# Patient Record
Sex: MALE | Race: OTHER | Employment: FULL TIME | URBAN - METROPOLITAN AREA
[De-identification: names, ages, dates, MRNs, and addresses within clinical notes are randomized per-mention and may not be internally consistent; named-entity substitution may affect disease eponyms.]

---

## 2017-09-16 ENCOUNTER — HOSPITAL ENCOUNTER (EMERGENCY)
Age: 42
Discharge: HOME | End: 2017-09-16
Attending: EMERGENCY MEDICINE
Payer: COMMERCIAL

## 2017-09-16 VITALS
OXYGEN SATURATION: 98 % | WEIGHT: 210 LBS | DIASTOLIC BLOOD PRESSURE: 90 MMHG | HEART RATE: 73 BPM | RESPIRATION RATE: 16 BRPM | SYSTOLIC BLOOD PRESSURE: 154 MMHG | HEIGHT: 70 IN | TEMPERATURE: 98.2 F | BODY MASS INDEX: 30.06 KG/M2

## 2017-09-16 DIAGNOSIS — T75.4XXA TASER INJURY, INITIAL ENCOUNTER: Primary | ICD-10-CM
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
## 2017-09-16 PROCEDURE — 10120 INC&RMVL FB SUBQ TISS SMPL: CPT

## 2017-09-16 PROCEDURE — 99283 EMERGENCY DEPT VISIT LOW MDM: CPT

## 2017-09-16 PROCEDURE — 90471 IMMUNIZATION ADMIN: CPT | Performed by: EMERGENCY MEDICINE

## 2017-09-16 PROCEDURE — 6360000002 HC RX W HCPCS: Performed by: EMERGENCY MEDICINE

## 2017-09-16 PROCEDURE — 90715 TDAP VACCINE 7 YRS/> IM: CPT | Performed by: EMERGENCY MEDICINE

## 2017-09-16 RX ADMIN — TETANUS TOXOID, REDUCED DIPHTHERIA TOXOID AND ACELLULAR PERTUSSIS VACCINE, ADSORBED 0.5 ML: 5; 2.5; 8; 8; 2.5 SUSPENSION INTRAMUSCULAR at 18:10

## 2017-09-16 ASSESSMENT — PAIN DESCRIPTION - ORIENTATION: ORIENTATION: RIGHT

## 2017-09-16 ASSESSMENT — PAIN DESCRIPTION - FREQUENCY: FREQUENCY: CONTINUOUS

## 2017-09-16 ASSESSMENT — PAIN DESCRIPTION - PROGRESSION: CLINICAL_PROGRESSION: NOT CHANGED

## 2017-09-16 ASSESSMENT — PAIN DESCRIPTION - DESCRIPTORS: DESCRIPTORS: CONSTANT

## 2017-09-16 ASSESSMENT — PAIN DESCRIPTION - PAIN TYPE: TYPE: ACUTE PAIN

## 2017-09-16 ASSESSMENT — ENCOUNTER SYMPTOMS
SHORTNESS OF BREATH: 0
ABDOMINAL PAIN: 0

## 2017-09-16 ASSESSMENT — PAIN SCALES - GENERAL: PAINLEVEL_OUTOF10: 2

## 2017-09-16 ASSESSMENT — PAIN DESCRIPTION - LOCATION: LOCATION: HAND

## 2017-09-20 ENCOUNTER — OFFICE VISIT (OUTPATIENT)
Dept: INTERNAL MEDICINE CLINIC | Age: 42
End: 2017-09-20
Payer: COMMERCIAL

## 2017-09-20 VITALS
HEIGHT: 70 IN | HEART RATE: 75 BPM | WEIGHT: 214 LBS | BODY MASS INDEX: 30.64 KG/M2 | SYSTOLIC BLOOD PRESSURE: 123 MMHG | DIASTOLIC BLOOD PRESSURE: 82 MMHG | TEMPERATURE: 98.1 F | OXYGEN SATURATION: 97 %

## 2017-09-20 DIAGNOSIS — R53.83 FATIGUE, UNSPECIFIED TYPE: Primary | ICD-10-CM

## 2017-09-20 DIAGNOSIS — L98.9 SKIN LESION: ICD-10-CM

## 2017-09-20 DIAGNOSIS — T14.8XXA SUPERFICIAL ABRASION: ICD-10-CM

## 2017-09-20 PROCEDURE — 99203 OFFICE O/P NEW LOW 30 MIN: CPT | Performed by: FAMILY MEDICINE

## 2017-09-20 ASSESSMENT — ENCOUNTER SYMPTOMS
RESPIRATORY NEGATIVE: 1
ALLERGIC/IMMUNOLOGIC NEGATIVE: 1
EYES NEGATIVE: 1

## 2017-09-20 ASSESSMENT — PATIENT HEALTH QUESTIONNAIRE - PHQ9
1. LITTLE INTEREST OR PLEASURE IN DOING THINGS: 0
SUM OF ALL RESPONSES TO PHQ QUESTIONS 1-9: 0
SUM OF ALL RESPONSES TO PHQ9 QUESTIONS 1 & 2: 0
2. FEELING DOWN, DEPRESSED OR HOPELESS: 0

## 2017-09-21 LAB
AVERAGE GLUCOSE: NORMAL
BASOPHILS ABSOLUTE: NORMAL /ΜL
BASOPHILS RELATIVE PERCENT: NORMAL %
CHOLESTEROL, TOTAL: NORMAL MG/DL
CHOLESTEROL/HDL RATIO: NORMAL
EOSINOPHILS ABSOLUTE: NORMAL /ΜL
EOSINOPHILS RELATIVE PERCENT: NORMAL %
HBA1C MFR BLD: NORMAL %
HCT VFR BLD CALC: NORMAL % (ref 41–53)
HDLC SERPL-MCNC: NORMAL MG/DL (ref 35–70)
HEMOGLOBIN: NORMAL G/DL (ref 13.5–17.5)
LDL CHOLESTEROL CALCULATED: NORMAL MG/DL (ref 0–160)
LYMPHOCYTES ABSOLUTE: NORMAL /ΜL
LYMPHOCYTES RELATIVE PERCENT: NORMAL %
MCH RBC QN AUTO: NORMAL PG
MCHC RBC AUTO-ENTMCNC: NORMAL G/DL
MCV RBC AUTO: NORMAL FL
MONOCYTES ABSOLUTE: NORMAL /ΜL
MONOCYTES RELATIVE PERCENT: NORMAL %
NEUTROPHILS ABSOLUTE: NORMAL /ΜL
NEUTROPHILS RELATIVE PERCENT: NORMAL %
PLATELET # BLD: NORMAL K/ΜL
PMV BLD AUTO: NORMAL FL
RBC # BLD: NORMAL 10^6/ΜL
TRIGL SERPL-MCNC: NORMAL MG/DL
TSH SERPL DL<=0.05 MIU/L-ACNC: NORMAL UIU/ML
VLDLC SERPL CALC-MCNC: NORMAL MG/DL
WBC # BLD: NORMAL 10^3/ML

## 2017-10-19 ENCOUNTER — OFFICE VISIT (OUTPATIENT)
Dept: INTERNAL MEDICINE CLINIC | Age: 42
End: 2017-10-19
Payer: COMMERCIAL

## 2017-10-19 VITALS
BODY MASS INDEX: 31.58 KG/M2 | OXYGEN SATURATION: 98 % | RESPIRATION RATE: 17 BRPM | HEART RATE: 72 BPM | HEIGHT: 70 IN | DIASTOLIC BLOOD PRESSURE: 80 MMHG | WEIGHT: 220.6 LBS | SYSTOLIC BLOOD PRESSURE: 110 MMHG

## 2017-10-19 DIAGNOSIS — J34.3 HYPERTROPHY OF NASAL TURBINATES: ICD-10-CM

## 2017-10-19 DIAGNOSIS — R53.83 FATIGUE, UNSPECIFIED TYPE: ICD-10-CM

## 2017-10-19 DIAGNOSIS — J30.1 CHRONIC SEASONAL ALLERGIC RHINITIS DUE TO POLLEN: Primary | ICD-10-CM

## 2017-10-19 DIAGNOSIS — R74.8 LOW SERUM HDL: ICD-10-CM

## 2017-10-19 PROCEDURE — 99213 OFFICE O/P EST LOW 20 MIN: CPT | Performed by: FAMILY MEDICINE

## 2017-10-19 ASSESSMENT — ENCOUNTER SYMPTOMS
ALLERGIC/IMMUNOLOGIC NEGATIVE: 1
EYES NEGATIVE: 1
RESPIRATORY NEGATIVE: 1
GASTROINTESTINAL NEGATIVE: 1
SINUS COMPLAINT: 1

## 2017-10-19 NOTE — PROGRESS NOTES
nasal turbinates             Plan:      31-year-old male returns for follow-up. He is afebrile hemodynamically stable, clinical examination is benign. History of chronic allergic rhinitis without any recent flare. He is advised to continue Flonase I also advised him to refrain from harsh blowing of nose. Use over-the-counter nasal saline [place] recent labs reviewed, discussed with patient, question answered. He has low HDL. He says he has a very healthy lifestyle with low-fat high-fiber diet, go to the gym every day and maintain his optimal weight. He denies early family history of cardiovascular disease  Depression screen is negative  He denies tobacco, excessive alcohol or illicit drug use  Patient does not offer any further concern  Observe and call for any concern  This note is created with a voice recognition program and while intend to generate a document that accurately reflects the content of the visit, no guarantee can be provided that every mistake has been identified and corrected by editing.